# Patient Record
Sex: FEMALE | Race: WHITE | NOT HISPANIC OR LATINO | Employment: PART TIME | URBAN - METROPOLITAN AREA
[De-identification: names, ages, dates, MRNs, and addresses within clinical notes are randomized per-mention and may not be internally consistent; named-entity substitution may affect disease eponyms.]

---

## 2020-10-12 ENCOUNTER — OFFICE VISIT CONVERTED (OUTPATIENT)
Dept: GASTROENTEROLOGY | Facility: CLINIC | Age: 40
End: 2020-10-12
Attending: NURSE PRACTITIONER

## 2020-10-20 ENCOUNTER — HOSPITAL ENCOUNTER (OUTPATIENT)
Dept: OTHER | Facility: HOSPITAL | Age: 40
Discharge: HOME | End: 2020-10-20
Attending: NURSE PRACTITIONER

## 2020-10-20 LAB
C DIFF TOX B STL QL CT TISS CULT: NEGATIVE
CONV 027 TOXIN: NEGATIVE

## 2020-10-22 LAB — BACTERIA SPEC AEROBE CULT: NORMAL

## 2020-10-29 LAB — ELASTASE PANC STL-MCNT: 350 UG ELAST./G

## 2021-04-30 ENCOUNTER — HOSPITAL ENCOUNTER (OUTPATIENT)
Dept: GASTROENTEROLOGY | Facility: HOSPITAL | Age: 41
Setting detail: HOSPITAL OUTPATIENT SURGERY
Discharge: HOME | End: 2021-04-30
Attending: INTERNAL MEDICINE

## 2021-04-30 LAB — HCG UR QL: NEGATIVE

## 2021-05-10 NOTE — H&P
"   History and Physical      Patient Name: Mckenzie Andrews   Patient ID: [id number]   Sex: Female   YOB: 1980    Primary Care Provider: Buck Humphries MD   Referring Provider: Brayan Moreno MD    Visit Date: October 12, 2020    Provider: LILIAM Jones   Location: [facility]   Location Address: [address] [place] [address], [address]  [place]  [id number]   Location Phone: [phone]          Chief Complaint  · Diarrhea      History Of Present Illness  The patient is a 40 year old /White female who presents on referral from Brayan Moreno MD for a gastroenterology evaluation.      Patient reports she has been struggling with diarrhea for many years now. Having a bowel movement 2-3x in the morning and within 30 minutes of every meal, loose stool. Stool has \" a odd smell to it  Fecal urgency is also present. Patient reports she carries wipes in her car due to previous episodes of fecal incontinence. Denies any abdominal pain, hematochezia or melena.    Appetite and weight stable. Denies frequent NSAID usage.     PMH: Maternal grandmother- colon cancer, dx 70's.         Past Medical History  Allergic rhinitis, chronic; Asthma; Chronic diarrhea; High cholesterol         Past Surgical History  Colon; LEEP; lesion removal         Allergy List  SULFA (SULFONAMIDES)       Allergies Reconciled  Family Medical History  Diabetes, unspecified type; Family history of colon cancer         Social History  Alcohol (Current some day); Caffeine (Never); Second hand smoke exposure (Never); Tobacco (Former)         Review of Systems  · Constitutional  o Denies  o : chills, fever  · Eyes  o Denies  o : blurred vision, changes in vision  · Cardiovascular  o Denies  o : chest pain, syncope  · Respiratory  o Denies  o : shortness of breath, hoarseness  · Gastrointestinal  o Admits  o : See HPI  · Genitourinary  o Denies  o : dysuria, blood in urine  · Integument  o Denies  o : rash, new skin " "lesions  · Neurologic  o Denies  o : altered mental status, tingling or numbness  · Musculoskeletal  o Denies  o : joint pain, limitation of motion  · Endocrine  o Denies  o : weight gain, weight loss  · Psychiatric  o Denies  o : anxiety, depression      Vitals  Date Time BP Position Site L\R Cuff Size HR RR TEMP (F) WT  HT  BMI kg/m2 BSA m2 O2 Sat FR L/min FiO2 HC       10/12/2020 01:29 /72 Sitting    80 - R   172lbs 0oz 5'  5\" 28.62 1.89             Physical Examination  · Constitutional  o Appearance  o : well developed, well-nourished, in no acute distress  · Eyes  o Vision  o :   § Visual Fields  § : eyes move symmetrical in all directions  o Sclerae  o : anicteric  o Pupils and Irises  o : pupils equal and symmetrical  · Neck  o Inspection/Palpation  o : supple  · Respiratory  o Respiratory Effort  o : breathing unlabored  o Inspection of Chest  o : normal appearance, no retractions  o Auscultation of Lungs  o : clear to auscultation bilaterally  · Cardiovascular  o Heart  o :   § Auscultation of Heart  § : no murmurs, gallops or rubs  · Gastrointestinal  o Abdominal Examination  o : soft, nontender to palpation, with normal active bowel sounds, no appreciable hepatosplenomegaly  o Digital Rectal Exam  o : deferred  · Lymphatic  o Neck  o : no palpable lymphadenopathy  · Skin and Subcutaneous Tissue  o General Inspection  o : without focal lesions; turgor is normal  · Psychiatric  o General  o : Alert and oriented x3  o Mood and Affect  o : Mood and affect are appropriate to circumstances          Assessment  · Diarrhea     787.91/R19.7      Plan  · Orders  o [facility] Pre-Op Covid-19 Screening (13999) - - 10/12/2020  o C difficile Toxigenic Assay (PCR) [facility] (89208) - - 10/12/2020  o Stool culture and sensitivity (70879) - - 10/12/2020  o Giardia and Cryptosporidium Enzyme Immunoassay [facility] (93221, 31348) - - 10/12/2020  o Lactoferrin (Fecal) Qualitative (34400) - - 10/12/2020  o Pancreatic elastase stool " (93031) - - 10/12/2020  o Consent for Colonoscopy with Possible Biopsy - Possible risks/complications, benefits, and alternatives to surgical or invasive procedure have been explained to patient and/or legal guardian. -Patient has been evaluated and can tolerate anesthesia and/or sedation. Risks, benefits, and alternatives to anesthesia and sedation have been explained to patient and/or legal guardian. (84388) - - 10/12/2020  · Medications  o dicyclomine 10 mg oral capsule   SIG: take 1 capsule by oral route 4 times a day prn abdominal cramping   DISP: (120) Capsule with 2 refills  Prescribed on 10/12/2020     o Medications have been Reconciled  o Transition of Care or Provider Policy  · Instructions  o PLAN: Proceed with procedure. Patient understands risks and benefits and is willing to proceed. Understands the risks include, but are not limited to, bleeding and/or perforation.  o Information given on current diagnoses.  o Lifestyle modifications discussed.  o Electronically Identified Patient Education Materials Provided Electronically  · Disposition  o Follow up after procedure            Electronically Signed by: LILIAM Jones -Author on October 12, 2020 02:02:36 PM

## 2021-05-14 VITALS
HEART RATE: 80 BPM | SYSTOLIC BLOOD PRESSURE: 127 MMHG | BODY MASS INDEX: 28.66 KG/M2 | WEIGHT: 172 LBS | DIASTOLIC BLOOD PRESSURE: 72 MMHG | HEIGHT: 65 IN

## 2021-08-24 ENCOUNTER — OFFICE VISIT (OUTPATIENT)
Dept: GASTROENTEROLOGY | Facility: CLINIC | Age: 41
End: 2021-08-24

## 2021-08-24 VITALS
BODY MASS INDEX: 28.82 KG/M2 | DIASTOLIC BLOOD PRESSURE: 61 MMHG | WEIGHT: 173 LBS | SYSTOLIC BLOOD PRESSURE: 113 MMHG | HEART RATE: 83 BPM | HEIGHT: 65 IN

## 2021-08-24 DIAGNOSIS — K52.832 LYMPHOCYTIC COLITIS: Primary | ICD-10-CM

## 2021-08-24 DIAGNOSIS — R19.7 DIARRHEA, UNSPECIFIED TYPE: ICD-10-CM

## 2021-08-24 PROBLEM — N64.4 MASTALGIA: Status: ACTIVE | Noted: 2021-07-23

## 2021-08-24 PROBLEM — J45.909 ASTHMA: Status: ACTIVE | Noted: 2021-08-24

## 2021-08-24 PROBLEM — J30.9 ALLERGIC RHINITIS: Status: ACTIVE | Noted: 2021-08-24

## 2021-08-24 PROBLEM — E78.00 HIGH CHOLESTEROL: Status: ACTIVE | Noted: 2021-08-24

## 2021-08-24 PROCEDURE — 99213 OFFICE O/P EST LOW 20 MIN: CPT | Performed by: INTERNAL MEDICINE

## 2021-08-24 RX ORDER — BUDESONIDE 3 MG/1
CAPSULE, COATED PELLETS ORAL
COMMUNITY
Start: 2021-05-04

## 2021-08-24 RX ORDER — CLOBETASOL PROPIONATE 0.5 MG/G
OINTMENT TOPICAL
COMMUNITY
Start: 2021-06-28

## 2021-08-24 RX ORDER — ROSUVASTATIN CALCIUM 40 MG/1
40 TABLET, COATED ORAL DAILY
COMMUNITY
Start: 2021-01-07 | End: 2022-01-08

## 2021-08-24 NOTE — PROGRESS NOTES
Patient Name: Mckenzie Andrews   Visit Date: 08/24/2021   Patient ID: [id number]  Provider: Korina Campos MD    Sex: female  Location: [facility]   YOB: 1980  [facility] [address],?[place]    Primary Care Provider Brayan Moreno MD  Location Phone: [phone]   Referring Provider: No ref. provider found        Chief Complaint  Follow-up (Colonoscopy f/u)    History of Present Illness  Mckenzie Andrews is a 41 y.o. female who presents to [facility] for follow-up of diarrhea.    Pt reports diarrhea x years.  Stools loose, not watery.  2 - 3 BM in AM and bowel movement after eating.  Symptoms not bothersome.  No melena, hematochezia.  Recent colonoscopy with lymphocytic colitis on biopsies.  Budesonide taper prescribed but patient has not started secondary to recent issues with elevated liver enzymes attributed to cholesterol med.  Appetite and weight stable. Denies frequent NSAID usage.  Feels like a lot of her diarrhea symptoms worsened after starting her cholesterol meds.  Has been off Crestor after liver enzymes increase.    PMH: Maternal grandmother- colon cancer, dx 70's.    Colonoscopy (4/30/21): normal TI, two 3 mm transverse polyps (prominent lypmphoid aggregate), o/w normal colon.  Lymphocytic colitis on bx's.    Past Medical History:   Diagnosis Date   • Asthma    • Chronic allergic rhinitis    • Chronic diarrhea 11/20/2014   • High cholesterol        Past Surgical History:   Procedure Laterality Date   • COLON SURGERY     • EXCISION LESION      removal lesion from lip   • LEEP           Current Outpatient Medications:   •  Budesonide (ENTOCORT EC) 3 MG 24 hr capsule, budesonide 3 mg oral capsule,delayed,extend.release take 3 capsules (9 mg) by oral route once daily in the morning for 8 weeks then 2 caps po daily x 1 month then 1 cap po daily x 1 month then 5/4/2021  Active,  "Disp: , Rfl:   •  Calcium Carbonate 1500 (600 Ca) MG tablet, , Disp: , Rfl:   •  clobetasol (TEMOVATE) 0.05 % ointment, APPLY TWICE A DAY FOR 2 TO 4 WEEKS THEN AS NEEDED FOR FLARES., Disp: , Rfl:   •  rosuvastatin (CRESTOR) 40 MG tablet, Take 40 mg by mouth Daily., Disp: , Rfl:      Allergies   Allergen Reactions   • Sulfa Antibiotics Unknown - High Severity and Rash   • Atorvastatin Other (See Comments)     Severe Muscle weakness       Family History   Problem Relation Age of Onset   • Diabetes Maternal Grandmother    • Colon cancer Maternal Grandmother 70   • Diabetes Maternal Grandfather         Social History     Social History Narrative   • Not on file       Objective     Review of Systems   Constitutional: Negative for chills, fever, unexpected weight gain and unexpected weight loss.   Respiratory: Negative for cough and shortness of breath.    Cardiovascular: Negative for chest pain.   Gastrointestinal:        Positive for *See HPI*   Skin: Skin lesions: No new lesions.   Neurological: Negative for numbness (or tingling).        Vital Signs:   /61   Pulse 83   Ht 165.1 cm (65\")   Wt 78.5 kg (173 lb)   BMI 28.79 kg/m²       Physical Exam  Constitutional:       General: She is not in acute distress.     Appearance: Normal appearance.   HENT:      Head: Normocephalic.   Eyes:      Conjunctiva/sclera: Conjunctivae normal.      Pupils: Pupils are equal, round, and reactive to light.      Visual Fields: Right eye visual fields normal and left eye visual fields normal.   Neck:      Trachea: Trachea normal.   Cardiovascular:      Rate and Rhythm: Normal rate and regular rhythm.      Heart sounds: Normal heart sounds.   Pulmonary:      Effort: Pulmonary effort is normal.      Breath sounds: Normal breath sounds and air entry.   Abdominal:      General: Abdomen is flat. Bowel sounds are normal.      Palpations: Abdomen is soft. There is no mass.      Tenderness: There is no guarding.   Musculoskeletal:      " "Right lower leg: No edema.      Left lower leg: No edema.   Skin:     Findings: No lesion.   Neurological:      Mental Status: She is alert and oriented to person, place, and time.   Psychiatric:         Mood and Affect: Mood and affect normal.         Result Review :   The following data was reviewed by: Korina Campos MD on 08/24/2021:          Data reviewed: GI studies colonoscopy           Assessment and Plan    Diagnoses and all orders for this visit:    1. Lymphocytic colitis (Primary)    2. Diarrhea, unspecified type      Would do course of budesonide once liver enzymes improve.  Discussed that course of budesonide could potentially put lymphocytic colitis in \"remission\".      Follow Up   Return in about 6 months (around 2/24/2022).  Patient was given instructions and counseling regarding her condition or for health maintenance advice. Please see specific information pulled into the AVS if appropriate.              Korina Campos M.D.  [facility] [address] [place]  [address]  Office: [phone]  "

## 2022-02-28 ENCOUNTER — TELEPHONE (OUTPATIENT)
Dept: GASTROENTEROLOGY | Facility: CLINIC | Age: 42
End: 2022-02-28

## 2022-02-28 NOTE — TELEPHONE ENCOUNTER
Patient has called the office and was upset she was scheduled with Keven Collins. Patient was offered an appt with Dr Campos but patient was not happy about the wait time/lack of availability for Dr Campos's appt.